# Patient Record
Sex: FEMALE | Race: WHITE | NOT HISPANIC OR LATINO | URBAN - METROPOLITAN AREA
[De-identification: names, ages, dates, MRNs, and addresses within clinical notes are randomized per-mention and may not be internally consistent; named-entity substitution may affect disease eponyms.]

---

## 2023-03-11 ENCOUNTER — EMERGENCY (EMERGENCY)
Facility: HOSPITAL | Age: 53
LOS: 1 days | Discharge: HOME | End: 2023-03-11
Admitting: STUDENT IN AN ORGANIZED HEALTH CARE EDUCATION/TRAINING PROGRAM
Payer: COMMERCIAL

## 2023-03-11 VITALS
SYSTOLIC BLOOD PRESSURE: 136 MMHG | HEART RATE: 84 BPM | DIASTOLIC BLOOD PRESSURE: 89 MMHG | RESPIRATION RATE: 18 BRPM | OXYGEN SATURATION: 99 % | TEMPERATURE: 99 F

## 2023-03-11 PROCEDURE — 99284 EMERGENCY DEPT VISIT MOD MDM: CPT

## 2023-03-11 RX ORDER — TETANUS TOXOID, REDUCED DIPHTHERIA TOXOID AND ACELLULAR PERTUSSIS VACCINE, ADSORBED 5; 2.5; 8; 8; 2.5 [IU]/.5ML; [IU]/.5ML; UG/.5ML; UG/.5ML; UG/.5ML
0.5 SUSPENSION INTRAMUSCULAR ONCE
Refills: 0 | Status: COMPLETED | OUTPATIENT
Start: 2023-03-11 | End: 2023-03-11

## 2023-03-11 RX ADMIN — TETANUS TOXOID, REDUCED DIPHTHERIA TOXOID AND ACELLULAR PERTUSSIS VACCINE, ADSORBED 0.5 MILLILITER(S): 5; 2.5; 8; 8; 2.5 SUSPENSION INTRAMUSCULAR at 21:30

## 2023-03-11 NOTE — ED PROVIDER NOTE - PATIENT PORTAL LINK FT
You can access the FollowMyHealth Patient Portal offered by [facility] by registering at the following website: [email or web address]. By joining [employer]’s FollowMyHealth portal, you will also be able to view your health information using other applications (apps) compatible with our system.

## 2023-03-11 NOTE — ED PROVIDER NOTE - OBJECTIVE STATEMENT
Patient is a 53-year-old female with no pertinent past medical history presents with finger laceration 3 days ago.  Patient reports and attempting to catch a ceramic plate, plate broke causing a laceration of left hand third digit distal phalanx, which patient cleansed with Betadine.  Patient reports her friend told her to go to the emergency department to get wound evaluated.  Patient denies any fevers, chills, numbness, weakness, pain, swelling, discharge.  Patient reports tetanus shot is up-to-date.

## 2023-03-11 NOTE — ED PROVIDER NOTE - NSFOLLOWUPINSTRUCTIONS_ED_ALL_ED_FT
Follow up with your PMD within 48-72 hours for wound check. Clean with soap and tepid water daily.  Apply bacitracin twice a day. Please return immediately to the Emergency Department if you have any worsening or change in your condition or if you have any other problems or concerns at all, including but not limited to any increased pain, redness, streaking (red lines), swelling, fever, chills.

## 2023-03-11 NOTE — ED PROVIDER NOTE - PHYSICAL EXAMINATION
+healing laceration at distal phalanx of 3rd digit of left hand; no tenderness; no drainage; no warmth; no fluctuance; no kanavel signs of tenosynovitis; 5/5 strength; sensation intact to light touch; < 2 sec cap refill ;no point tenderness; no crepitus

## 2023-03-11 NOTE — ED PROVIDER NOTE - CLINICAL SUMMARY MEDICAL DECISION MAKING FREE TEXT BOX
Patient is a 53-year-old female with no pertinent past medical history presents with finger laceration 3 days ago.  Patient reports and attempting to catch a ceramic plate, plate broke causing a laceration of left hand third digit distal phalanx, which patient cleansed with Betadine.  Patient reports her friend told her to go to the emergency department to get wound evaluated.  Patient denies any fevers, chills, numbness, weakness, pain, swelling, discharge.  Patient reports tetanus shot is up-to-date.  This is a patient with a healing laceration of the finger without evidence of infection; not clinically concerning for flexor tenosynovitis.  There is no indication for x-ray, antibiotics or consultation at this time.  Will discharge patient with instructions to clean wound daily with soap and water and apply bacitracin.  Patient to follow-up with PMD.